# Patient Record
Sex: FEMALE | Race: WHITE
[De-identification: names, ages, dates, MRNs, and addresses within clinical notes are randomized per-mention and may not be internally consistent; named-entity substitution may affect disease eponyms.]

---

## 2017-01-11 ENCOUNTER — HOSPITAL ENCOUNTER (OUTPATIENT)
Dept: HOSPITAL 45 - C.MAMM | Age: 52
Discharge: HOME | End: 2017-01-11
Attending: FAMILY MEDICINE
Payer: COMMERCIAL

## 2017-01-11 DIAGNOSIS — R92.8: ICD-10-CM

## 2017-01-11 DIAGNOSIS — R92.1: ICD-10-CM

## 2017-01-11 DIAGNOSIS — Z12.31: Primary | ICD-10-CM

## 2017-01-12 NOTE — MAMMOGRAPHY REPORT
BILATERAL DIGITAL SCREENING MAMMOGRAM TOMOSYNTHESIS WITH CAD: 1/11/2017

CLINICAL HISTORY: Routine screening examination.  





TECHNIQUE:  Breast tomosynthesis in addition to standard 2D mammography was performed. Current study
 was also evaluated with a Computer Aided Detection (CAD) system.  



COMPARISON: Comparison is made to exams dated:  1/6/2016 mammogram, 12/31/2014 mammogram, 12/30/2013
 mammogram, 12/28/2012 mammogram, 12/27/2011 mammogram - [facility], and 5/20/2009
.   



BREAST COMPOSITION:  The tissue of both breasts is heterogeneously dense, which may obscure small ma
sses.  



FINDINGS:  There is a 6 mm angular asymmetry with new associated calcification in the far superior r
ight breast, only seen on the MLO view, for which additional spot compression tomosynthesis and spot
 magnification views with possible ultrasound are recommended.



There are stable scattered and grouped benign-appearing microcalcifications throughout each breast. 
 No other suspicious mass, architectural distortion or cluster of microcalcifications is seen.  



IMPRESSION:  ACR BI-RADS CATEGORY 0: INCOMPLETE EVALUATION:  NEED ADDITIONAL IMAGING EVALUATION

The 6 mm angular asymmetry with new associated calcification in the right superior breast/axillary t
ail needs additional evaluation.  

The patient will be called to schedule an appointment.  





Approximately 10% of breast cancers are not detected with mammography. A negative mammographic repor
t should not delay biopsy if a clinically suggestive mass is present.



Ernestine Lucio M.D.          

ay/:1/11/2017 17:19:32  



Imaging Technologist: Maureen WEBBER)(JANE), [facility]

letter sent: Addl Imaging 0  

BI-RADS Code: ACR BI-RADS Category 0: Incomplete Evaluation:  Need Additional Imaging Evaluation

## 2017-01-23 ENCOUNTER — HOSPITAL ENCOUNTER (OUTPATIENT)
Dept: HOSPITAL 45 - C.MAMM | Age: 52
Discharge: HOME | End: 2017-01-23
Attending: FAMILY MEDICINE
Payer: COMMERCIAL

## 2017-01-23 DIAGNOSIS — R92.8: ICD-10-CM

## 2017-01-23 DIAGNOSIS — R92.0: ICD-10-CM

## 2017-01-23 DIAGNOSIS — N64.9: Primary | ICD-10-CM

## 2017-01-23 NOTE — MAMMOGRAPHY REPORT
UNILATERAL RIGHT DIGITAL DIAGNOSTIC MAMMOGRAM TOMOSYNTHESIS AND TARGETED RIGHT ULTRASOUND: 1/23/2017

CLINICAL HISTORY: 51-year-old woman called back from screening mammography for an asymmetry with pos
sible associated microcalcification in the far superior axillary tail of the right breast, only seen
 on the MLO view.  Family history of breast cancer = mother and aunt.  Patient has a personal histor
y of prior benign breast biopsy.  





TECHNIQUE: Right XCCL, spot magnification ML and spot compression 2-D digital and tomosynthesis MLO 
views were obtained.  



COMPARISON: Comparison is made to exams dated:  1/11/2017 mammogram, 1/6/2016 mammogram, 12/31/2014 
mammogram, 1/3/2014 mammogram, 12/30/2013 mammogram, and 12/27/2011 mammogram - [facility].   



BREAST COMPOSITION:  The tissue of the right breast is heterogeneously dense, which may obscure smal
l masses.  



FINDINGS:  There is partial effacement of the asymmetry in the far superior axillary tail region on 
the spot compression right MLO view including, synthesis images.  This tissue has the appearance of 
normal accessory breast tissue and has been present on prior mammograms dating back to at least 2008
.  There is no persistent nodularity.  However, there are 1-2 microcalcifications associated with th
is accessory breast tissue.  When comparing to all available prior mammograms, one of the calcificat
ions may have been present in 2013.  Nevertheless, given the increased conspicuity a short interval 
follow-up right mammogram including spot magnification views is recommended to ensure stability in 6
 months.



Targeted ultrasound was performed in the right axillary tail.  Dense glandular tissue with tubular i
nterspersed milked ducts are seen in the axillary tail region 19-20 cm from the nipple in the approx
imate 10:00 right breast.  However, there is no evidence of a suspicious solid mass in this location
.



IMPRESSION:  ACR-BI-RADS CATEGORY 3: PROBABLY BENIGN, TARGETED ULTRASOUND ACR-BI-RADS CATEGORY 3: AR
OBABLY BENIGN 

There is accessory breast tissue in the right axillary tail, without evidence of an underlying mass.
  1-2 microcalcifications associated with the glandular tissue in this location are increased and co
nspicuity compared to prior exams.  Therefore, a short interval follow-up right mammogram including 
spot magnification views and possible ultrasound is recommended to ensure stability in 6 months.  



These results and recommendations were discussed with the patient at the time of the exam.  She tent
atively scheduled a follow-up appointment prior to leaving our department.





Approximately 10% of breast cancers are not detected with mammography. A negative mammographic repor
t should not delay biopsy if a clinically suggestive mass is present.



Ernestine Lucio M.D.          

ay/:1/23/2017 09:32:39  



Imaging Technologist: Damaris Lucio, [facility]

letter sent: Follow Up Recommended 3  

BI-RADS Code: ACR-BI-RADS Category 3: Probably Benign  Ultrasound BI-RADS: ACR-BI-RADS Category 3: P
robably Benign

## 2017-06-30 ENCOUNTER — HOSPITAL ENCOUNTER (OUTPATIENT)
Dept: HOSPITAL 45 - C.LABMFLN | Age: 52
Discharge: HOME | End: 2017-06-30
Attending: FAMILY MEDICINE
Payer: COMMERCIAL

## 2017-06-30 DIAGNOSIS — I10: Primary | ICD-10-CM

## 2017-06-30 DIAGNOSIS — E78.5: ICD-10-CM

## 2017-06-30 LAB
ALBUMIN/GLOB SERPL: 0.9 {RATIO} (ref 0.9–2)
ALP SERPL-CCNC: 63 U/L (ref 45–117)
ALT SERPL-CCNC: 25 U/L (ref 12–78)
ANION GAP SERPL CALC-SCNC: 6 MMOL/L (ref 3–11)
AST SERPL-CCNC: 24 U/L (ref 15–37)
BASOPHILS # BLD: 0.01 K/UL (ref 0–0.2)
BASOPHILS NFR BLD: 0.2 %
BUN SERPL-MCNC: 17 MG/DL (ref 7–18)
BUN/CREAT SERPL: 19.1 (ref 10–20)
CALCIUM SERPL-MCNC: 8.9 MG/DL (ref 8.5–10.1)
CHLORIDE SERPL-SCNC: 104 MMOL/L (ref 98–107)
CHOLEST/HDLC SERPL: 3.1 {RATIO}
CO2 SERPL-SCNC: 25 MMOL/L (ref 21–32)
COMPLETE: YES
CREAT SERPL-MCNC: 0.89 MG/DL (ref 0.6–1.2)
EOSINOPHIL NFR BLD AUTO: 359 K/UL (ref 130–400)
GLOBULIN SER-MCNC: 4.2 GM/DL (ref 2.5–4)
GLUCOSE SERPL-MCNC: 90 MG/DL (ref 70–99)
GLUCOSE UR QL: 68 MG/DL
HCT VFR BLD CALC: 37.9 % (ref 37–47)
IG%: 0.2 %
IMM GRANULOCYTES NFR BLD AUTO: 29.1 %
KETONES UR QL STRIP: 132 MG/DL
LYMPHOCYTES # BLD: 1.88 K/UL (ref 1.2–3.4)
MCH RBC QN AUTO: 29.2 PG (ref 25–34)
MCHC RBC AUTO-ENTMCNC: 33 G/DL (ref 32–36)
MCV RBC AUTO: 88.6 FL (ref 80–100)
MONOCYTES NFR BLD: 10.5 %
NEUTROPHILS # BLD AUTO: 0.6 %
NEUTROPHILS NFR BLD AUTO: 59.4 %
NITRITE UR QL STRIP: 71 MG/DL (ref 0–150)
PH UR: 214 MG/DL (ref 0–200)
PMV BLD AUTO: 8.8 FL (ref 7.4–10.4)
POTASSIUM SERPL-SCNC: 4.4 MMOL/L (ref 3.5–5.1)
RBC # BLD AUTO: 4.28 M/UL (ref 4.2–5.4)
SODIUM SERPL-SCNC: 135 MMOL/L (ref 136–145)
TSH SERPL-ACNC: 2.26 UIU/ML (ref 0.3–4.5)
VERY LOW DENSITY LIPOPROT CALC: 14 MG/DL
WBC # BLD AUTO: 6.47 K/UL (ref 4.8–10.8)

## 2017-07-24 ENCOUNTER — HOSPITAL ENCOUNTER (OUTPATIENT)
Dept: HOSPITAL 45 - C.MAMM | Age: 52
Discharge: HOME | End: 2017-07-24
Attending: FAMILY MEDICINE
Payer: COMMERCIAL

## 2017-07-24 DIAGNOSIS — N64.89: Primary | ICD-10-CM

## 2017-07-24 NOTE — MAMMOGRAPHY REPORT
UNILATERAL RIGHT DIGITAL DIAGNOSTIC MAMMOGRAM TOMOSYNTHESIS WITH CAD AND TARGETED RIGHT ULTRASOUND: 7
/24/2017

CLINICAL HISTORY: Six-month follow-up of right breast asymmetry and calcifications.  Family history o
f breast cancer including her mother and aunt.  





TECHNIQUE:  Breast tomosynthesis in addition to standard 2D mammography was performed. Current study 
was also evaluated with a Computer Aided Detection (CAD) system.  Right CC and MLO and spot compressi
on right CC and MLO 2-D and tomosynthesis images and spot magnification right cc and ML views were ob
tained.



COMPARISON: Comparison is made to exams dated:  1/23/2017 ultrasound, 1/11/2017 mammogram, 1/23/2017 
mammogram, 1/6/2016 mammogram, 12/31/2014 mammogram, and 1/3/2014 ultrasound - [facility].   



BREAST COMPOSITION:  The tissue of the right breast is heterogeneously dense, which may obscure small
 masses.  



FINDINGS:  Again noted is an asymmetry in the right axillary tail region seen on the MLO view, which 
is stable compared to multiple prior exams and is consistent with accessory breast tissue.  Magnifica
tion views of this region demonstrate 1-2 benign-appearing calcifications associated with the accesso
ry breast tissue which are stable compared to the January 2017 exam and are considered benign given t
he morphology and stability.  No suspicious cluster of calcifications is noted in this region.



There is questionable architectural distortion seen within the right lateral breast at approximately 
9:00, only seen on the tomosynthesis images.  The possible architectural distortion does not clearly 
persist on the spot compression views.  The remainder of the right breast is stable compared to prior
 exams, without suspicious masses, calcifications, or areas of architectural distortion noted.  Scatt
ered benign-appearing calcifications are not significantly changed.



Targeted ultrasound was performed of the area of the possible architectural distortion within the rig
ht 9:00 breast.  The breast tissue is markedly heterogeneous on ultrasound, with multiple areas of sh
adowing seen which reduces the sensitivity of the exam.  One focal shadowing hypoechoic region is see
n within the right breast at 9:00, 2 cm from the nipple on the antiradial image, which does not persi
st on radial imaging and therefore likely does not represent a true finding.  No clear masses or othe
r suspicious findings are seen although ultrasound exam is limited.  Given the questionable 
ural distortion mammographically and given the strong family history of breast cancer, recommend furt
her evaluation with breast MRI.



IMPRESSION:  ACR BI-RADS CATEGORY 0: INCOMPLETE EVALUATION:  NEED ADDITIONAL IMAGING EVALUATION, TARG
ETED ULTRASOUND ACR BI-RADS CATEGORY 0: INCOMPLETE EVALUATION:  NEED ADDITIONAL IMAGING EVALUATION 

1.  The accessory breast tissue in the right axillary tail with 1-2 associated benign-appearing calci
fications is stable and considered benign.  No suspicious cluster of calcifications is noted.  

2.  Questionable architectural distortion within the right lateral breast at approximately 9:00, with
out a clear sonographic correlate evident although the ultrasound exam is limited due to heterogeneou
s tissue sonographically.  Given the possible architectural distortion and given the strong family hi
story of breast cancer, recommend further evaluation with bilateral breast MRI.



The patient has been verbally notified of the results.  





Approximately 10% of breast cancers are not detected with mammography. A negative mammographic report
 should not delay biopsy if a clinically suggestive mass is present.



Miracle Smyth M.D.          

ah/:7/24/2017 11:55:42  



Imaging Technologist: Damaris Lucio, [facility]

letter sent: Addl Imaging 0  

BI-RADS Code: ACR BI-RADS Category 0: Incomplete Evaluation:  Need Additional Imaging Evaluation  Ult
rasound BI-RADS: ACR BI-RADS Category 0: Incomplete Evaluation:  Need Additional Imaging Evaluation

## 2017-08-01 ENCOUNTER — HOSPITAL ENCOUNTER (OUTPATIENT)
Dept: HOSPITAL 45 - C.MRI | Age: 52
Discharge: HOME | End: 2017-08-01
Attending: FAMILY MEDICINE
Payer: COMMERCIAL

## 2017-08-01 DIAGNOSIS — R92.8: Primary | ICD-10-CM

## 2017-08-03 NOTE — MAMMOGRAPHY REPORT
BREAST MRI OF BOTH BREASTS : 8/1/2017

CLINICAL HISTORY: 52-year-old woman with a strong family history of breast cancer presents for breast
 MRI to assess possible architectural distortion in the lateral right breast, best seen on the CC vie
w.  





COMPARISON: Comparison is made to exams dated:  7/24/2017 mammogram, 1/23/2017 ultrasound, 1/23/2017 
mammogram, 1/11/2017 mammogram, 1/6/2016 mammogram, and 12/31/2014 mammogram - [facility].   



TECHNIQUE: Using a 1.5 Dede magnet and dedicated breast coil, multisequence axial images were obtain
ed through the breasts.  After uneventful IV administration of 7 mL of Gadavist, dynamic multiphase c
ontrast-enhanced axial images, and sagittal postcontrast were obtained.  Temporal subtraction axial i
mages and 3-D MIP images are provided.  Everything was then reviewed on a 3-D workstation, [employer].



FINDINGS:



Right breast: There is mild-to-moderate background parenchymal enhancement with numerous round and ov
al scattered foci of enhancement throughout the right breast.  There is accessory breast tissue in th
e right axillary tail without evidence of associated mass or non-mass enhancement, or suspicious kine
tics, compatible with benign fibroglandular tissue.  There is an ovoid, fat density, 26 x 13 mm defec
t in the glandular tissue in the 9:00 anterior right breast, with mild associated architectural disto
rtion, in the location of the suspected architectural distortion seen mammographically.  There is no 
associated enhancement in this area of distortion or surrounding the defect.  Additionally, there are
 3-4 foci of susceptibility artifact along the periphery of the defect (best seen on axial T1 nonfat 
saturated page 132/240), suggesting this represents a surgical site.  This must be correlated with pr
ior records.  There is an ovoid circumscribed hypoenhancing mass with internal septum in the far infe
rior 5:00 to 6:00 right breast that measures 4.8 x 7.0 x 3.5 mm (axial page 108/120 and sagittal page
 100/132).  This is T2 hyperintense and does not demonstrate any suspicious kinetics.  This is most c
ompatible with a benign fibroadenoma.  There is no evidence of a suspicious enhancing mass, suspiciou
s non-mass enhancement or washout kinetics in the right breast.  No focal skin thickening or nipple r
etraction.  No suspicious right axillary lymphadenopathy.



Left breast: There is mild-to-moderate background parenchymal enhancement with numerous scattered foc
i of enhancement throughout the left breast.  No suspicious enhancing mass, non-mass enhancement, arc
hitectural distortion or suspicious kinetics are identified in the left breast.  No focal skin thicke
jossy or nipple retraction.  No suspicious left axillary lymphadenopathy.





IMPRESSION: ACR BI-RADS CATEGORY 2: BENIGN 

1.  There is a well circumscribed oval central fat density defect in the glandular tissue of the 9:00
 anterior right breast, containing susceptibility artifact likely surgical material at the periphery,
 and demonstrating no associated enhancement.  This is most compatible with a prior surgical site and
 correlation with prior medical/surgical records is recommended.

2.  Overall, no suspicious enhancing mass, non-mass enhancement or suspicious washout kinetics are se
en bilaterally.  No suspicious axillary adenopathy bilaterally.  There is no MRI evidence of malignan
cy and would recommend return to annual screening mammography schedule, which is due in January 2018.
  Given the strong family history of breast cancer and dense breasts, also consider additional screen
ing with breast MRI.

The patient will receive written notification of the results.  





Ernestine Lucio M.D.  

ay/:8/2/2017 21:21:25  



Imaging Technologist: MRI Technologist, [facility]

letter sent: Normal 1/2  

BI-RADS Code: ACR BI-RADS Category 2: Benign

## 2018-01-17 ENCOUNTER — HOSPITAL ENCOUNTER (OUTPATIENT)
Dept: HOSPITAL 45 - C.MAMM | Age: 53
Discharge: HOME | End: 2018-01-17
Attending: FAMILY MEDICINE
Payer: COMMERCIAL

## 2018-01-17 DIAGNOSIS — Z12.31: Primary | ICD-10-CM

## 2018-01-18 NOTE — MAMMOGRAPHY REPORT
BILATERAL DIGITAL SCREENING MAMMOGRAM TOMOSYNTHESIS WITH CAD: 1/17/2018

CLINICAL HISTORY: Routine screening.  Patient has no complaints.  





TECHNIQUE:  Breast tomosynthesis in addition to standard 2D mammography was performed. Current study 
was also evaluated with a Computer Aided Detection (CAD) system.  



COMPARISON: Comparison is made to exams dated:  1/11/2017 mammogram, 1/6/2016 mammogram, 12/31/2014 m
ammogram, 12/30/2013 mammogram, 12/28/2012 mammogram, and 12/27/2011 mammogram - [facility].   



BREAST COMPOSITION:  The tissue of both breasts is heterogeneously dense, which may obscure small mas
ses.  



FINDINGS:The fibroglandular pattern is similar to multiple prior mammograms including stable asymmetr
ies in the superior posterior aspect of each breast on the MLO views.  There is also stable accessory
 breast tissue in the far superior right breast on the MLO view.  Numerous stable groupings of benign
-appearing punctate and round microcalcifications.  Expected architectural distortion in the upper ou
ter quadrant of the right breast, from prior excision.  No new suspicious mass, architectural distort
ion or cluster of microcalcifications is seen.  



IMPRESSION:  ACR BI-RADS CATEGORY 1: NEGATIVE

There is no mammographic evidence of malignancy. A 1 year screening mammogram is recommended.  The pa
tient will receive written notification of the results.  





Approximately 10% of breast cancers are not detected with mammography. A negative mammographic report
 should not delay biopsy if a clinically suggestive mass is present.



Ernestine Lucio M.D.          

ay/:1/17/2018 17:04:45  



Imaging Technologist: Maureen WEBBER)(JANE), [facility]

letter sent: Normal 1/2  

BI-RADS Code: ACR BI-RADS Category 1: Negative

## 2018-07-19 ENCOUNTER — HOSPITAL ENCOUNTER (OUTPATIENT)
Dept: HOSPITAL 45 - C.LABMFLN | Age: 53
Discharge: HOME | End: 2018-07-19
Attending: FAMILY MEDICINE
Payer: COMMERCIAL

## 2018-07-19 DIAGNOSIS — I10: Primary | ICD-10-CM

## 2018-07-19 DIAGNOSIS — E78.5: ICD-10-CM

## 2018-07-19 LAB
ALBUMIN SERPL-MCNC: 3.2 GM/DL (ref 3.4–5)
ALP SERPL-CCNC: 67 U/L (ref 45–117)
ALT SERPL-CCNC: 22 U/L (ref 12–78)
AST SERPL-CCNC: 19 U/L (ref 15–37)
BASOPHILS # BLD: 0.01 K/UL (ref 0–0.2)
BASOPHILS NFR BLD: 0.2 %
BUN SERPL-MCNC: 17 MG/DL (ref 7–18)
CALCIUM SERPL-MCNC: 8.3 MG/DL (ref 8.5–10.1)
CO2 SERPL-SCNC: 25 MMOL/L (ref 21–32)
CREAT SERPL-MCNC: 0.71 MG/DL (ref 0.6–1.2)
EOS ABS #: 0.03 K/UL (ref 0–0.5)
EOSINOPHIL NFR BLD AUTO: 351 K/UL (ref 130–400)
GLUCOSE SERPL-MCNC: 81 MG/DL (ref 70–99)
HCT VFR BLD CALC: 36.4 % (ref 37–47)
HGB BLD-MCNC: 12 G/DL (ref 12–16)
IG#: 0.01 K/UL (ref 0–0.02)
IMM GRANULOCYTES NFR BLD AUTO: 37.9 %
KETONES UR QL STRIP: 98 MG/DL
LYMPHOCYTES # BLD: 2.1 K/UL (ref 1.2–3.4)
MCH RBC QN AUTO: 29.6 PG (ref 25–34)
MCHC RBC AUTO-ENTMCNC: 33 G/DL (ref 32–36)
MCV RBC AUTO: 89.7 FL (ref 80–100)
MONO ABS #: 0.63 K/UL (ref 0.11–0.59)
MONOCYTES NFR BLD: 11.4 %
NEUT ABS #: 2.76 K/UL (ref 1.4–6.5)
NEUTROPHILS # BLD AUTO: 0.5 %
NEUTROPHILS NFR BLD AUTO: 49.8 %
PH UR: 184 MG/DL (ref 0–200)
PMV BLD AUTO: 9.4 FL (ref 7.4–10.4)
POTASSIUM SERPL-SCNC: 4.1 MMOL/L (ref 3.5–5.1)
PROT SERPL-MCNC: 7.5 GM/DL (ref 6.4–8.2)
RED CELL DISTRIBUTION WIDTH CV: 13 % (ref 11.5–14.5)
RED CELL DISTRIBUTION WIDTH SD: 42.4 FL (ref 36.4–46.3)
SODIUM SERPL-SCNC: 137 MMOL/L (ref 136–145)
WBC # BLD AUTO: 5.54 K/UL (ref 4.8–10.8)